# Patient Record
(demographics unavailable — no encounter records)

---

## 2025-02-18 NOTE — DISCUSSION/SUMMARY
[FreeTextEntry1] : The visit was provided via telehealth using real-time 2-way audio visual technology. The patient, Carmita Cantu, was located at home, [address] [place] [address], at the time of the visit. The Genetic Counselor, Imani Cosme, was located remotely in [place], [place]. The patient and provider both participated in the telehealth encounter. Consent for telehealth services was given on 2025 by the patient, Carmita Cantu.    REASON FOR CONSULT  Carmita Cantu is a 56-year-old female who was seen 2025 for a discussion regarding her genetic testing results related to hereditary cancer predisposition.  RELEVANT MEDICAL HISTORY  Ms. Cantu was diagnosed with L-breast cancer in 2024 at age 56 (IDC. ER+ %, LA+ 3%, Her2 IHC Equivocal, Her2 Fish Neg). She is s/p left lumpectomy and SLN biopsy on 10/8/24. She also reports completing 16 sessions of radiation therapy. She also states she is currently taking anti estrogen therapy and plans to switch medications to letrozole starting tonight.  Ms. 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 This test was ordered by CATARINA Garg and reported on 10/14/2024.    OTHER MEDICAL AND SURGICAL HISTORY:  Medical: Deviated septum Surgical:  x2. Rhinoplasty   PAST OB/GYN HISTORY:  Obstetrical History:   Age at Menarche: 11  Menopausal with LMP at age 54 1.5 years ago Age at First Live Birth: 24  Oral Contraceptive Use: Yes, starting age 17-18 for cycle control/management, until age 50 Hormone Replacement Therapy: Yes, 1.5 weeks prior to diagnosis in  then stopped (estrogen patch with a pill)    CANCER SCREENING HISTORY:    Breast:  2024 sMammo Frandy/US Breast: BR 0 2024 Callback L-mammo/US L-limited: BR4 Rec 1 site US L-breast biopsy 2024 L-breast US biopsy 1:00 6cmFN: Invasive ductal carcinoma (ER+ %, LA+ 3%, Her2 IHC Equivocal, Her2 Fish Neg) 2024 MR Breast Frandy: BR6 Other Breast Biopsies: None  GYN: -	Most recent GYN annual exam: 2024  -	Most recent pap smear: 2024; Results: reportedly normal -	TVUS: None Colon: -	Most recent colonoscopy: 2024; Results: reportedly 0 polyps; Frequency: Ms. Cantu states her GI recommended a 5 year interval after her most recently colonoscopy which was increased from her previous 3 year interval  -	Total Polyps: Ms. Cantu reports having 3 total colonoscopies on which only her first in 2018 revealed polyps which she states was 1-2 precancerous polyps Skin:   -	Most recent skin exam: 2025; Frequency: yearly -	Lesions Biopsied/removed: Ms. Cantu reports cryotherapy was performed for a few lesions of white milia   SOCIAL HISTORY:  -	Tobacco-product use: Never smoker -	Environmental exposure: None    FAMILY HISTORY:  Maternal ancestry was reported as Polish and Martiniquais (Unknown if Ashkenazi Orthodox) and paternal ancestry was reported as possible Eastern  (Unknown if Ashkenazi Orthodox). A detailed family history of cancer was ascertained, see below and scanned pedigree in chart.   To Ms. Cantu's knowledge, no one in the family has had germline testing for cancer susceptibility.      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   Although this variant is associated with an increased risk for colorectal cancer, it was emphasized to Ms. Cantu that this specific APC variant is NOT consistent with the classic polyposis syndrome Familial Adenomatous Polyposis also known as FAP or with attenuated FAP (AFAP) as is the case with other pathogenic variants in the APC gene.  We discussed that approximately 10% of individuals with Ashkenazi Orthodox ancestry have the APC increased risk allele (p.K3011T) (Leonard, et al. 2018; Presley & Adrianna, 2004). Additionally, Ms. Cantu was informed that the average lifetime risk for colorectal cancer, for unscreened individuals of Ashkenazi-Orthodox heritage who carry the APC p.E9141X variant, is up to 10% compared to the general population risk of about 4.2% (Katwalker, et al. 2018; Presley & Adrianna, 2004). Per NCCN Genetic/Familial High-Risk Assessment: Colorectal, Endometrial, Gastric Guidelines Version 3., "[t]he incidence of CRC in probands and family members is similar for both Ashkenazi Orthodox APC V6132X heterozygotes and non-Orthodox D8826B heterozygotes. The same screening recommendations apply to all APC X5226J variant heterozygotes." However, there is currently limited data regarding the lifetime colorectal cancer risk for those undergoing incremental colon screening. In addition, the risk for extra-colonic malignancies has not been well defined. Information on this risk allele is still evolving.   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 At this time the available evidence is insufficient to determine the role of this VUS in disease and the clinical significance of this result is uncertain. Individuals with a single pathogenic mutation in NTHL1 are not believed to have any increased risk for cancer. Individuals with two pathogenic mutations in the NTHL1 gene may have an increased risk for colorectal polyps and colorectal cancer. It is unknown if the patient has an increased risk for the cancers associated with the NTHL1 gene at this time.    The detection of this VUS should not currently change the patient's medical management. It is not recommended at this time that family members use this VUS result for predictive genetic testing or medical management decisions. With more research, a VUS may be reclassified as either disease-causing or benign. The patient was encouraged to contact us every 2-3 years to inquire about any new information for this variant.     We also discussed that, while no cause of the patient's personal and family history of cancer was identified, this result, while reassuring, does entirely not rule out a hereditary cancer risk in the patient. It is possible, although unlikely, the patient has a mutation in one of the genes tested that is not detectable by this analysis, or has a mutation in a different gene, either known or unknown. It is also possible there is a hereditary cancer predisposition in the family, but the patient did not inherit it.    IMPLICATIONS FOR THE PATIENT:  Given Ms. Cantu's personal and current reported family history of cancer, and her APC low penetrance risk allele genetic test results, the following screening guidelines and risk-reducing recommendations were discussed:    BREAST:  Long-term management and surveillance should be based on Ms. Cantu's on-going treatment protocol as recommended by her oncology team.    COLON: As per current NCCN guidelines, unaffected individuals with the APC S9717D variant should undergo colonoscopy beginning at age 40 and repeat every 5 years (or sooner based on colonoscopy findings). Of note, Ms. Cantu is already undergoing colonoscopy screening at least every 5 years.  OTHER: In the absence of other indications, Ms. Cantu should practice age-appropriate cancer screening of other organ systems as recommended for the general population.    IMPLICATIONS FOR FAMILY MEMBERS:  This mutation is inherited in an autosomal dominant pattern. We recommend the patient's first-degree relatives, specifically her brother, parents and children, pursue genetic counseling and genetic testing as there is a 50% chance they also have the same mutation. We discussed that Ms. Cantu's parents may consider pursuing genetic counseling and genetic testing as it is unclear currently which side of the family this APC mutation was inherited from, although it is not clear whether testing positive will impact either of her parents' medical management at their current ages. Ms. Cantu was made aware that if any at-risk relatives wanted to pursue genetic testing any time in the future, we would be happy to see them and coordinate testing.  If they are not local, they can locate a genetic counselor using the National Society of Genetic Counselors, Find a Genetic Counselor Tool (www.nsgc.org/findageneticcounselor).   REPRODUCTIVE IMPLICATIONS AND OPTIONS  For anyone who tests positive, the risk of passing on this mutation to a future generation is 50%.    RESOURCES & SUPPORT GROUPS  Facing Our Risk of cancer Empowered (FORCE): www.FacingOurRisk.org     In addition, the oncology social workers at [facility] are available to assist with more referrals, if necessary.     PLAN:  1. See above note for recommended management.  2. We encouraged sharing these results with family members. They have a risk to have inherited the same mutation. Other family may benefit from genetic testing and should contact a certified genetic counselor specializing in cancer. Due to HIPAA and [place] laws, Genetics is unable to directly contact other family at risk, but we are available should family members wish to reach out to us  3. Family support resources and referrals were provided.   4. Patient informed consult note(s) will be available through their [facility] patient portal.   5. Ms. Cantu was sent a medical release form to complete, sign, and return to allow discussion of her genetics information with her relatives, such as her children. This will be scanned into her chart once returned.  6. Genetic knowledge changes rapidly. We encouraged re-contacting Cancer Genetics every 2-3 years for any changes in screening recommendations or sooner if there are significant changes in personal or family history    For any additional questions please call Cancer Genetics at [phone].       Imani Cosme MS, [facility]  Genetic Counselor, Cancer Genetics       CC:   Patient  Dr. Susan Palleschi